# Patient Record
(demographics unavailable — no encounter records)

---

## 2025-02-27 NOTE — PHYSICAL EXAM
[Normal Sclera/Conjunctiva] : normal sclera/conjunctiva [No Respiratory Distress] : no respiratory distress  [No Accessory Muscle Use] : no accessory muscle use [Normal] : normal rate, regular rhythm, normal S1 and S2 and no murmur heard [de-identified] : obese  [de-identified] : Swelling of the upper eyelid right side [de-identified] : On oxygen distant lung sounds [de-identified] : Using walker

## 2025-02-27 NOTE — HISTORY OF PRESENT ILLNESS
[Hypertension] : Hypertension [Obesity] : Obesity [Other: ___] : [unfilled]: [FreeTextEntry6] : Sees pain management and pulmonology  Requesting neurology for restless leg Requesting ophthalmology Requesting Ortho for right hip pain  Has had a stye over his right eye over the past 3 days that is really bothering him warm compresses have not helped Has large ventral hernia Sees pulmonology but needs prednisone and Z-Petros on hand as he does not have these and does not have follow-up with pulmonology for another week Requesting testosterone check and explained I will not check this as I will not manage it

## 2025-03-14 NOTE — ASSESSMENT
[FreeTextEntry1] : Patient with severe COPD and morbid obesity.  He is maintained on Trelegy and is doing fairly well.  There have been no major exacerbations.  He has had a minor exacerbation over the past 2 weeks but I am hesitant to use steroids because of his weight issue.  He is to monitor his respiratory status on Trelegy and let me know if he has any further symptoms.  I would like to see the patient again in 3 months to monitor his weight.  I have discussed weight management with the patient.

## 2025-03-14 NOTE — PHYSICAL EXAM
[No Acute Distress] : no acute distress [Normal Oropharynx] : normal oropharynx [Normal Appearance] : normal appearance [No Neck Mass] : no neck mass [Normal Rate/Rhythm] : normal rate/rhythm [Normal S1, S2] : normal s1, s2 [No Murmurs] : no murmurs [No Resp Distress] : no resp distress [No Abnormalities] : no abnormalities [Benign] : benign [Normal Gait] : normal gait [No Clubbing] : no clubbing [No Cyanosis] : no cyanosis [FROM] : FROM [1+ Pitting] : 1+ pitting [Normal Color/ Pigmentation] : normal color/ pigmentation [No Focal Deficits] : no focal deficits [Oriented x3] : oriented x3 [Normal Affect] : normal affect [TextBox_2] : morbid obesity [TextBox_68] : decreased bs's

## 2025-06-24 NOTE — HISTORY OF PRESENT ILLNESS
[TextBox_4] : Patient with severe COPD, morbid obesity and chronic pain syndrome.  He has been overall stable over the past 3 months with no major exacerbations.  He gets short of breath at times walking around the home.  He uses oxygen most of the day at 3 L.  He occasionally takes it off if it is he is at rest.  He is on Ozempic and he states he believes the last time he was weighed he was 320 however his current weight here in the office is 360.  He denies cough wheezing.  He is maintained on Trelegy.  He complains of nasal stuffiness and uses Afrin frequently.  He is instructed to stop using Afrin.

## 2025-06-24 NOTE — ASSESSMENT
[FreeTextEntry1] : Patient with severe COPD and morbid obesity.  He has not lost weight and is currently at 360.  His COPD is stable.  He is instructed to discuss increasing the dose of Ozempic.  He must continue attempts at weight loss and I will see him again in 3 months.